# Patient Record
Sex: FEMALE | Race: WHITE | NOT HISPANIC OR LATINO | URBAN - METROPOLITAN AREA
[De-identification: names, ages, dates, MRNs, and addresses within clinical notes are randomized per-mention and may not be internally consistent; named-entity substitution may affect disease eponyms.]

---

## 2017-04-05 ENCOUNTER — APPOINTMENT (RX ONLY)
Dept: URBAN - METROPOLITAN AREA CLINIC 167 | Facility: CLINIC | Age: 47
Setting detail: DERMATOLOGY
End: 2017-04-05

## 2017-04-05 DIAGNOSIS — Z41.9 ENCOUNTER FOR PROCEDURE FOR PURPOSES OTHER THAN REMEDYING HEALTH STATE, UNSPECIFIED: ICD-10-CM

## 2017-04-05 NOTE — HPI: OTHER
Condition:: Btx - a tx
Please Describe Your Condition:: No known contraindications to treatment with botulinum toxin . See \"Cosmetic Procedure\" note in Attachments.

## 2017-07-25 ENCOUNTER — APPOINTMENT (RX ONLY)
Dept: URBAN - METROPOLITAN AREA CLINIC 170 | Facility: CLINIC | Age: 47
Setting detail: DERMATOLOGY
End: 2017-07-25

## 2017-07-25 DIAGNOSIS — Z41.9 ENCOUNTER FOR PROCEDURE FOR PURPOSES OTHER THAN REMEDYING HEALTH STATE, UNSPECIFIED: ICD-10-CM

## 2017-07-25 PROCEDURE — ? BOTOX

## 2017-07-25 NOTE — PROCEDURE: BOTOX
Additional Area 1 Location: Face
Glabellar Complex Units: 0
Dilution (U/0.1 Cc): 4
Additional Area 1 Units: 28
Detail Level: Zone
Consent: Written consent obtained. Risks include but not limited to lid/brow ptosis, bruising, swelling, diplopia, temporary effect, incomplete chemical denervation.
Lot #: [id number]
Expiration Date (Month Year): 01/20

## 2017-11-14 ENCOUNTER — APPOINTMENT (RX ONLY)
Dept: URBAN - METROPOLITAN AREA CLINIC 170 | Facility: CLINIC | Age: 47
Setting detail: DERMATOLOGY
End: 2017-11-14

## 2017-11-14 DIAGNOSIS — Z41.9 ENCOUNTER FOR PROCEDURE FOR PURPOSES OTHER THAN REMEDYING HEALTH STATE, UNSPECIFIED: ICD-10-CM

## 2017-11-14 PROCEDURE — ? BOTOX

## 2017-11-14 NOTE — PROCEDURE: BOTOX
Nasal Root Units: 0
Expiration Date (Month Year): 04/20
Consent: Written consent obtained. Risks include but not limited to lid/brow ptosis, bruising, swelling, diplopia, temporary effect, incomplete chemical denervation.
Additional Area 1 Location: Face
Lot #: [id number]
Additional Area 1 Units: 28
Dilution (U/0.1 Cc): 4
Detail Level: Zone

## 2017-11-14 NOTE — HPI: OTHER
Condition:: Btx-a tx
Please Describe Your Condition:: No known contraindications to treatment with botulinum toxin . See “Cosmetic Procedure” note in Attachments.

## 2018-03-01 ENCOUNTER — APPOINTMENT (RX ONLY)
Dept: URBAN - METROPOLITAN AREA CLINIC 170 | Facility: CLINIC | Age: 48
Setting detail: DERMATOLOGY
End: 2018-03-01

## 2018-03-01 DIAGNOSIS — Z41.9 ENCOUNTER FOR PROCEDURE FOR PURPOSES OTHER THAN REMEDYING HEALTH STATE, UNSPECIFIED: ICD-10-CM

## 2018-03-01 PROCEDURE — ? BOTOX

## 2018-03-01 NOTE — PROCEDURE: BOTOX
Inferior Lateral Orbicularis Oculi Units: 0
Additional Area 1 Location: Face
Dilution (U/0.1 Cc): 4
Detail Level: Zone
Consent: Written consent obtained. Risks include but not limited to lid/brow ptosis, bruising, swelling, diplopia, temporary effect, incomplete chemical denervation.
Additional Area 1 Units: 28
Expiration Date (Month Year): 09/20
Lot #: [id number]

## 2018-06-07 ENCOUNTER — APPOINTMENT (RX ONLY)
Dept: URBAN - METROPOLITAN AREA CLINIC 170 | Facility: CLINIC | Age: 48
Setting detail: DERMATOLOGY
End: 2018-06-07

## 2018-06-07 DIAGNOSIS — Z41.9 ENCOUNTER FOR PROCEDURE FOR PURPOSES OTHER THAN REMEDYING HEALTH STATE, UNSPECIFIED: ICD-10-CM

## 2018-06-07 PROCEDURE — ? BOTOX

## 2018-06-07 NOTE — PROCEDURE: BOTOX
Expiration Date (Month Year): 10/20
Additional Area 3 Units: 0
Additional Area 1 Units: 28
Additional Area 1 Location: Face
Detail Level: Zone
Dilution (U/0.1 Cc): 4
Lot #: [id number]
Consent: Written consent obtained. Risks include but not limited to lid/brow ptosis, bruising, swelling, diplopia, temporary effect, incomplete chemical denervation.

## 2018-09-11 ENCOUNTER — APPOINTMENT (RX ONLY)
Dept: URBAN - METROPOLITAN AREA CLINIC 170 | Facility: CLINIC | Age: 48
Setting detail: DERMATOLOGY
End: 2018-09-11

## 2018-09-11 DIAGNOSIS — Z41.9 ENCOUNTER FOR PROCEDURE FOR PURPOSES OTHER THAN REMEDYING HEALTH STATE, UNSPECIFIED: ICD-10-CM

## 2018-09-11 PROCEDURE — ? BOTOX

## 2018-09-11 NOTE — HPI: OTHER
Condition:: btx-a treatment
Please Describe Your Condition:: comes in for btx-a treatment . No known contraindications to treatment with botulinum toxin. See “Cosmetic Procedure” note in Attachments.

## 2018-09-11 NOTE — PROCEDURE: BOTOX
Detail Level: Zone
Additional Area 4 Units: 0
Additional Area 1 Location: Face
Dilution (U/0.1 Cc): 4
Lot #: [id number]
Additional Area 1 Units: 29
Expiration Date (Month Year): 3/21
Consent: Written consent obtained. Risks include but not limited to lid/brow ptosis, bruising, swelling, diplopia, temporary effect, incomplete chemical denervation.

## 2018-12-18 ENCOUNTER — APPOINTMENT (RX ONLY)
Dept: URBAN - METROPOLITAN AREA CLINIC 170 | Facility: CLINIC | Age: 48
Setting detail: DERMATOLOGY
End: 2018-12-18

## 2018-12-18 DIAGNOSIS — Z41.9 ENCOUNTER FOR PROCEDURE FOR PURPOSES OTHER THAN REMEDYING HEALTH STATE, UNSPECIFIED: ICD-10-CM

## 2018-12-18 PROCEDURE — ? BOTOX

## 2018-12-18 NOTE — PROCEDURE: BOTOX
Additional Area 6 Units: 0
Dilution (U/0.1 Cc): 4
Additional Area 1 Units: 29
Consent: Written consent obtained. Risks include but not limited to lid/brow ptosis, bruising, swelling, diplopia, temporary effect, incomplete chemical denervation.
Detail Level: Zone
Expiration Date (Month Year): 04/21
Additional Area 1 Location: Face
Lot #: [id number]

## 2019-03-21 ENCOUNTER — APPOINTMENT (RX ONLY)
Dept: URBAN - METROPOLITAN AREA CLINIC 170 | Facility: CLINIC | Age: 49
Setting detail: DERMATOLOGY
End: 2019-03-21

## 2019-03-21 DIAGNOSIS — Z41.9 ENCOUNTER FOR PROCEDURE FOR PURPOSES OTHER THAN REMEDYING HEALTH STATE, UNSPECIFIED: ICD-10-CM

## 2019-03-21 PROCEDURE — ? BOTOX

## 2019-03-21 NOTE — PROCEDURE: BOTOX
Additional Area 1 Location: Face
Additional Area 3 Units: 0
Dilution (U/0.1 Cc): 4
Expiration Date (Month Year): 07/21
Additional Area 1 Units: 29
Lot #: [id number]
Detail Level: Zone
Consent: Written consent obtained. Risks include but not limited to lid/brow ptosis, bruising, swelling, diplopia, temporary effect, incomplete chemical denervation.

## 2019-06-25 ENCOUNTER — APPOINTMENT (RX ONLY)
Dept: URBAN - METROPOLITAN AREA CLINIC 170 | Facility: CLINIC | Age: 49
Setting detail: DERMATOLOGY
End: 2019-06-25

## 2019-06-25 DIAGNOSIS — Z41.9 ENCOUNTER FOR PROCEDURE FOR PURPOSES OTHER THAN REMEDYING HEALTH STATE, UNSPECIFIED: ICD-10-CM

## 2019-06-25 PROCEDURE — ? BOTOX

## 2019-06-25 NOTE — PROCEDURE: BOTOX
Glabellar Complex Units: 0
Dilution (U/0.1 Cc): 4
Consent: Written consent obtained. Risks include but not limited to lid/brow ptosis, bruising, swelling, diplopia, temporary effect, incomplete chemical denervation.
Additional Area 1 Units: 29
Expiration Date (Month Year): 09/21
Lot #: [id number]
Additional Area 1 Location: Face
Detail Level: Zone

## 2019-10-31 ENCOUNTER — APPOINTMENT (RX ONLY)
Dept: URBAN - METROPOLITAN AREA CLINIC 170 | Facility: CLINIC | Age: 49
Setting detail: DERMATOLOGY
End: 2019-10-31

## 2019-10-31 DIAGNOSIS — Z41.9 ENCOUNTER FOR PROCEDURE FOR PURPOSES OTHER THAN REMEDYING HEALTH STATE, UNSPECIFIED: ICD-10-CM

## 2019-10-31 PROCEDURE — ? BOTOX

## 2019-10-31 NOTE — PROCEDURE: BOTOX
Additional Area 5 Units: 0
Show Right And Left Brow Units: No
Show Nasal Units: Yes
Additional Area 1 Location: Face
Expiration Date (Month Year): 02/22
Additional Area 1 Units: 35
Lot #: [id number]
Consent: Written consent obtained. Risks include but not limited to lid/brow ptosis, bruising, swelling, diplopia, temporary effect, incomplete chemical denervation.
Dilution (U/0.1 Cc): 4
Detail Level: Zone

## 2019-10-31 NOTE — HPI: OTHER
Condition:: Btx- a tx
Please Describe Your Condition:: comes in for Btx- a tx . No known contraindications to treatment with botulinum toxin. See “Cosmetic Procedure” note in Attachments.

## 2020-02-27 ENCOUNTER — APPOINTMENT (RX ONLY)
Dept: URBAN - METROPOLITAN AREA CLINIC 173 | Facility: CLINIC | Age: 50
Setting detail: DERMATOLOGY
End: 2020-02-27

## 2020-02-27 DIAGNOSIS — Z41.9 ENCOUNTER FOR PROCEDURE FOR PURPOSES OTHER THAN REMEDYING HEALTH STATE, UNSPECIFIED: ICD-10-CM

## 2020-02-27 PROCEDURE — ? BOTOX

## 2020-02-27 NOTE — PROCEDURE: BOTOX
Inferior Lateral Orbicularis Oculi Units: 0
Show Masseter Units: Yes
Lot #: [id number]
Show Ucl Units: No
Dilution (U/0.1 Cc): 4
Detail Level: Zone
Consent: Written consent obtained. Risks include but not limited to lid/brow ptosis, bruising, swelling, diplopia, temporary effect, incomplete chemical denervation.
Additional Area 1 Location: Face
Additional Area 1 Units: 35
Expiration Date (Month Year): 8/22

## 2020-06-23 ENCOUNTER — APPOINTMENT (RX ONLY)
Dept: URBAN - METROPOLITAN AREA CLINIC 173 | Facility: CLINIC | Age: 50
Setting detail: DERMATOLOGY
End: 2020-06-23

## 2020-06-23 DIAGNOSIS — Z41.9 ENCOUNTER FOR PROCEDURE FOR PURPOSES OTHER THAN REMEDYING HEALTH STATE, UNSPECIFIED: ICD-10-CM

## 2020-06-23 PROCEDURE — ? BOTOX

## 2020-06-23 NOTE — PROCEDURE: BOTOX
Show Orbicularis Oculi Units: Yes
Additional Area 4 Units: 0
Detail Level: Zone
Show Right And Left Pupillary Line Units: No
Dilution (U/0.1 Cc): 4
Consent: Written consent obtained. Risks include but not limited to lid/brow ptosis, bruising, swelling, diplopia, temporary effect, incomplete chemical denervation.
Lot #: [id number]
Additional Area 1 Location: Face
Expiration Date (Month Year): 11/22
Additional Area 1 Units: 35

## 2020-06-23 NOTE — HPI: OTHER
Condition:: Btx-a tx
Please Describe Your Condition:: is being seen for Btx-a tx . No known contraindications to treatment with botulinum toxin. See “Cosmetic Procedure” note in Attachments.

## 2020-10-15 ENCOUNTER — APPOINTMENT (RX ONLY)
Dept: URBAN - METROPOLITAN AREA CLINIC 173 | Facility: CLINIC | Age: 50
Setting detail: DERMATOLOGY
End: 2020-10-15

## 2020-10-15 DIAGNOSIS — Z41.9 ENCOUNTER FOR PROCEDURE FOR PURPOSES OTHER THAN REMEDYING HEALTH STATE, UNSPECIFIED: ICD-10-CM

## 2020-10-15 PROCEDURE — ? BOTOX

## 2020-10-15 NOTE — PROCEDURE: BOTOX
Show Nasal Units: Yes
Lcl Root Units: 0
Expiration Date (Month Year): 05/23
Additional Area 1 Units: 35
Show Lcl Units: No
Detail Level: Zone
Consent: Written consent obtained. Risks include but not limited to lid/brow ptosis, bruising, swelling, diplopia, temporary effect, incomplete chemical denervation.
Lot #: [id number]
Additional Area 1 Location: Face
Dilution (U/0.1 Cc): 4

## 2020-12-07 NOTE — HPI: OTHER
Condition:: Botox-a tx
Please Describe Your Condition:: comes in for Botox-a tx . No known contraindications to botulism toxin with COLIN.
Implemented All Universal Safety Interventions:  [place] to call system. Call bell, personal items and telephone within reach. Instruct patient to call for assistance. Room bathroom lighting operational. Non-slip footwear when patient is off stretcher. Physically safe environment: no spills, clutter or unnecessary equipment. Stretcher in lowest position, wheels locked, appropriate side rails in place.

## 2021-03-09 ENCOUNTER — APPOINTMENT (RX ONLY)
Dept: URBAN - METROPOLITAN AREA CLINIC 173 | Facility: CLINIC | Age: 51
Setting detail: DERMATOLOGY
End: 2021-03-09

## 2021-03-09 DIAGNOSIS — Z41.9 ENCOUNTER FOR PROCEDURE FOR PURPOSES OTHER THAN REMEDYING HEALTH STATE, UNSPECIFIED: ICD-10-CM

## 2021-03-09 PROCEDURE — ? BOTOX

## 2021-03-09 NOTE — PROCEDURE: BOTOX
Show Right And Left Periorbital Units: No
Show Depressor Anguli Units: Yes
Additional Area 5 Units: 0
Additional Area 1 Units: 35
Dilution (U/0.1 Cc): 4
Additional Area 1 Location: Face
Lot #: [id number]
Consent: Written consent obtained. Risks include but not limited to lid/brow ptosis, bruising, swelling, diplopia, temporary effect, incomplete chemical denervation.
Expiration Date (Month Year): 10/23
Detail Level: Zone

## 2021-10-04 ENCOUNTER — APPOINTMENT (RX ONLY)
Dept: URBAN - METROPOLITAN AREA CLINIC 173 | Facility: CLINIC | Age: 51
Setting detail: DERMATOLOGY
End: 2021-10-04

## 2021-10-04 DIAGNOSIS — Z41.9 ENCOUNTER FOR PROCEDURE FOR PURPOSES OTHER THAN REMEDYING HEALTH STATE, UNSPECIFIED: ICD-10-CM

## 2021-10-04 PROCEDURE — ? BOTOX

## 2021-10-04 NOTE — PROCEDURE: BOTOX
Show Additional Area 4: Yes
Detail Level: Zone
Inferior Lateral Orbicularis Oculi Units: 0
Show Ucl Units: No
Lot #: [id number]
Consent: Written consent obtained. Risks include but not limited to lid/brow ptosis, bruising, swelling, diplopia, temporary effect, incomplete chemical denervation.
Dilution (U/0.1 Cc): 4
Additional Area 1 Location: Face
Expiration Date (Month Year): 01/24
Additional Area 1 Units: 35

## 2022-01-17 ENCOUNTER — APPOINTMENT (RX ONLY)
Dept: URBAN - METROPOLITAN AREA CLINIC 173 | Facility: CLINIC | Age: 52
Setting detail: DERMATOLOGY
End: 2022-01-17

## 2022-01-17 DIAGNOSIS — Z41.9 ENCOUNTER FOR PROCEDURE FOR PURPOSES OTHER THAN REMEDYING HEALTH STATE, UNSPECIFIED: ICD-10-CM

## 2022-01-17 PROCEDURE — ? BOTOX

## 2022-01-17 NOTE — PROCEDURE: BOTOX
Mentalis Units: 0
Detail Level: Zone
Show Masseter Units: Yes
Show Right And Left Periorbital Units: No
Consent: Written consent obtained. Risks include but not limited to lid/brow ptosis, bruising, swelling, diplopia, temporary effect, incomplete chemical denervation.
Additional Area 1 Units: 40
Dilution (U/0.1 Cc): 4
Expiration Date (Month Year): 04/24
Lot #: [id number]
Additional Area 1 Location: Face

## 2022-05-04 ENCOUNTER — APPOINTMENT (RX ONLY)
Dept: URBAN - METROPOLITAN AREA CLINIC 173 | Facility: CLINIC | Age: 52
Setting detail: DERMATOLOGY
End: 2022-05-04

## 2022-05-04 DIAGNOSIS — Z41.9 ENCOUNTER FOR PROCEDURE FOR PURPOSES OTHER THAN REMEDYING HEALTH STATE, UNSPECIFIED: ICD-10-CM

## 2022-05-04 PROCEDURE — ? BOTOX

## 2022-05-04 NOTE — PROCEDURE: BOTOX
Post-Care Instructions: Patient instructed to not lie down for 4 hours and limit physical activity for 24 hours. Patient instructed not to travel by airplane for 48 hours.
Show Masseter Units: Yes
Left Periorbital Units: 0
Show Right And Left Periorbital Units: No
Lot #: [id number]
Glabellar Complex Units: 21
Expiration Date (Month Year): 05/2024
Dilution (U/0.1 Cc): 5
Price (Use Numbers Only, No Special Characters Or $): 549
Detail Level: Detailed
Periorbital Skin Units: 13
Consent: Written consent obtained. Risks include but not limited to lid/brow ptosis, bruising, swelling, diplopia, temporary effect, incomplete chemical denervation.
Forehead Units: 4

## 2022-08-16 ENCOUNTER — APPOINTMENT (RX ONLY)
Dept: URBAN - METROPOLITAN AREA CLINIC 173 | Facility: CLINIC | Age: 52
Setting detail: DERMATOLOGY
End: 2022-08-16

## 2022-08-16 DIAGNOSIS — Z41.9 ENCOUNTER FOR PROCEDURE FOR PURPOSES OTHER THAN REMEDYING HEALTH STATE, UNSPECIFIED: ICD-10-CM

## 2022-08-16 PROCEDURE — ? BOTOX

## 2022-08-16 NOTE — PROCEDURE: BOTOX
Lateral Platysmal Bands Units: 0
Show Mentalis Units: No
Show Glabellar Units: Yes
Dilution (U/0.1 Cc): 0.2
Lot #: [id number]
Additional Area 1 Location: face
Additional Area 1 Units: 42
Expiration Date (Month Year): 7/24
Detail Level: Zone
Price (Use Numbers Only, No Special Characters Or $): 535
Consent: Written consent obtained. Risks include but not limited to lid/brow ptosis, bruising, swelling, diplopia, temporary effect, incomplete chemical denervation.
Post-Care Instructions: Patient instructed to not lie down for 4 hours and limit physical activity for 24 hours.
Additional Area 2 Location: [facility]

## 2022-11-28 ENCOUNTER — APPOINTMENT (RX ONLY)
Dept: URBAN - METROPOLITAN AREA CLINIC 173 | Facility: CLINIC | Age: 52
Setting detail: DERMATOLOGY
End: 2022-11-28

## 2022-11-28 DIAGNOSIS — Z41.9 ENCOUNTER FOR PROCEDURE FOR PURPOSES OTHER THAN REMEDYING HEALTH STATE, UNSPECIFIED: ICD-10-CM

## 2022-11-28 PROCEDURE — ? BOTOX

## 2022-11-28 NOTE — PROCEDURE: BOTOX
Lateral Platysmal Bands Units: 0
Show Mentalis Units: No
Show Glabellar Units: Yes
Dilution (U/0.1 Cc): 0.2
Lot #: [id number]
Additional Area 1 Location: face
Forehead Units: 10
Glabellar Complex Units: 21
Expiration Date (Month Year): 11/24
Detail Level: Zone
Periorbital Skin Units: 13
Price (Use Numbers Only, No Special Characters Or $): 094
Consent: Written consent obtained. Risks include but not limited to lid/brow ptosis, bruising, swelling, diplopia, temporary effect, incomplete chemical denervation.
Post-Care Instructions: Patient instructed to not lie down for 4 hours and limit physical activity for 24 hours.
Additional Area 2 Location: [facility]

## 2022-12-12 ENCOUNTER — APPOINTMENT (RX ONLY)
Dept: URBAN - METROPOLITAN AREA CLINIC 173 | Facility: CLINIC | Age: 52
Setting detail: DERMATOLOGY
End: 2022-12-12

## 2022-12-12 DIAGNOSIS — Z41.9 ENCOUNTER FOR PROCEDURE FOR PURPOSES OTHER THAN REMEDYING HEALTH STATE, UNSPECIFIED: ICD-10-CM

## 2022-12-12 PROCEDURE — ? DYSPORT

## 2022-12-12 NOTE — PROCEDURE: DYSPORT
Additional Area 6 Units: 0
Show Depressor Anguli Units: Yes
Detail Level: Detailed
Lot #: [id number] *DARSHANA Special*
Show Right And Left Pupillary Line Units: No
Dilution (U/ 0.1cc): 10
Expiration Date (Month Year): 04/23
Forehead Units: 2
Consent: Written consent obtained. Risks include but not limited to lid/brow ptosis, bruising, swelling, diplopia, temporary effect, incomplete chemical denervation.
Post-Care Instructions: Patient instructed to not lie down for 4 hours and limit physical activity for 24 hours.

## 2023-03-02 ENCOUNTER — APPOINTMENT (RX ONLY)
Dept: URBAN - METROPOLITAN AREA CLINIC 173 | Facility: CLINIC | Age: 53
Setting detail: DERMATOLOGY
End: 2023-03-02

## 2023-03-02 DIAGNOSIS — Z41.9 ENCOUNTER FOR PROCEDURE FOR PURPOSES OTHER THAN REMEDYING HEALTH STATE, UNSPECIFIED: ICD-10-CM

## 2023-03-02 PROCEDURE — ? BOTOX

## 2023-03-02 NOTE — PROCEDURE: BOTOX
Lateral Platysmal Bands Units: 0
Show Mentalis Units: No
Show Glabellar Units: Yes
Dilution (U/0.1 Cc): 0.2
Lot #: [id number]
Additional Area 1 Location: face
Forehead Units: 10
Glabellar Complex Units: 21
Expiration Date (Month Year): 11/24
Detail Level: Zone
Periorbital Skin Units: 13
Price (Use Numbers Only, No Special Characters Or $): 184
Consent: Written consent obtained. Risks include but not limited to lid/brow ptosis, bruising, swelling, diplopia, temporary effect, incomplete chemical denervation.
Post-Care Instructions: Patient instructed to not lie down for 4 hours and limit physical activity for 24 hours.
Additional Area 2 Location: [facility]

## 2023-06-07 ENCOUNTER — APPOINTMENT (RX ONLY)
Dept: URBAN - METROPOLITAN AREA CLINIC 173 | Facility: CLINIC | Age: 53
Setting detail: DERMATOLOGY
End: 2023-06-07

## 2023-06-07 DIAGNOSIS — Z41.9 ENCOUNTER FOR PROCEDURE FOR PURPOSES OTHER THAN REMEDYING HEALTH STATE, UNSPECIFIED: ICD-10-CM

## 2023-06-07 PROCEDURE — ? BOTOX

## 2023-06-07 NOTE — PROCEDURE: BOTOX
Lateral Platysmal Bands Units: 0
Show Mentalis Units: No
Show Glabellar Units: Yes
Dilution (U/0.1 Cc): 0.2
Lot #: [id number]
Additional Area 1 Location: face
Forehead Units: 10
Glabellar Complex Units: 21
Expiration Date (Month Year): 11/24
Detail Level: Zone
Periorbital Skin Units: 13
Price (Use Numbers Only, No Special Characters Or $): 285
Consent: Written consent obtained. Risks include but not limited to lid/brow ptosis, bruising, swelling, diplopia, temporary effect, incomplete chemical denervation.
Post-Care Instructions: Patient instructed to not lie down for 4 hours and limit physical activity for 24 hours.
Additional Area 2 Location: [facility]

## 2023-09-14 ENCOUNTER — APPOINTMENT (RX ONLY)
Dept: URBAN - METROPOLITAN AREA CLINIC 173 | Facility: CLINIC | Age: 53
Setting detail: DERMATOLOGY
End: 2023-09-14

## 2023-09-14 DIAGNOSIS — Z41.9 ENCOUNTER FOR PROCEDURE FOR PURPOSES OTHER THAN REMEDYING HEALTH STATE, UNSPECIFIED: ICD-10-CM

## 2023-09-14 PROCEDURE — ? BOTOX

## 2023-09-14 NOTE — PROCEDURE: BOTOX
Depressor Anguli Oris Units: 0
Show Anterior Platysmal Band Units: Yes
Consent: Written consent obtained. Risks include but not limited to lid/brow ptosis, bruising, swelling, diplopia, temporary effect, incomplete chemical denervation.
Post-Care Instructions: Patient instructed to not lie down for 4 hours and limit physical activity for 24 hours. Patient instructed not to travel by airplane for 48 hours.
Show Right And Left Periorbital Units: No
Periorbital Skin Units: 13
Dilution (U/0.1 Cc): 5
Lot #: [id number]
Incrementing Botox Units: By 0.5 Units
Glabellar Complex Units: 21
Forehead Units: 10
Expiration Date (Month Year): 12/25
Detail Level: Detailed
Price (Use Numbers Only, No Special Characters Or $): 393

## 2023-12-21 ENCOUNTER — APPOINTMENT (RX ONLY)
Dept: URBAN - METROPOLITAN AREA CLINIC 173 | Facility: CLINIC | Age: 53
Setting detail: DERMATOLOGY
End: 2023-12-21

## 2023-12-21 DIAGNOSIS — Z41.9 ENCOUNTER FOR PROCEDURE FOR PURPOSES OTHER THAN REMEDYING HEALTH STATE, UNSPECIFIED: ICD-10-CM

## 2023-12-21 PROCEDURE — ? BOTOX

## 2023-12-21 NOTE — PROCEDURE: BOTOX
Depressor Anguli Oris Units: 0
Show Anterior Platysmal Band Units: Yes
Consent: Written consent obtained. Risks include but not limited to lid/brow ptosis, bruising, swelling, diplopia, temporary effect, incomplete chemical denervation.
Post-Care Instructions: Patient instructed to not lie down for 4 hours and limit physical activity for 24 hours. Patient instructed not to travel by airplane for 48 hours.
Show Right And Left Periorbital Units: No
Periorbital Skin Units: 13
Dilution (U/0.1 Cc): 5
Lot #: [id number]
Incrementing Botox Units: By 0.5 Units
Glabellar Complex Units: 21
Forehead Units: 10
Expiration Date (Month Year): 04/2026
Detail Level: Detailed
Price (Use Numbers Only, No Special Characters Or $): 340

## 2024-01-11 ENCOUNTER — APPOINTMENT (RX ONLY)
Dept: URBAN - METROPOLITAN AREA CLINIC 173 | Facility: CLINIC | Age: 54
Setting detail: DERMATOLOGY
End: 2024-01-11

## 2024-01-11 DIAGNOSIS — Z41.9 ENCOUNTER FOR PROCEDURE FOR PURPOSES OTHER THAN REMEDYING HEALTH STATE, UNSPECIFIED: ICD-10-CM

## 2024-01-11 PROCEDURE — ? DYSPORT

## 2024-01-11 NOTE — PROCEDURE: DYSPORT
Lateral Platysmal Bands Units: 0
Show Orbicularis Oculi Units: Yes
Additional Area 1 Location: face
Show Mentalis Units: No
Additional Area 1 Units: 13
Post-Care Instructions: Patient instructed to not lie down for 4 hours and limit physical activity for 24 hours.
Consent: Written consent obtained. Risks include but not limited to lid/brow ptosis, bruising, swelling, diplopia, temporary effect, incomplete chemical denervation.
Lot #: [id number] *DARSHANA Special*
Detail Level: Detailed
Dilution (U/0.1 Cc): 10
Expiration Date (Month Year): 2/28/2025

## 2025-02-06 ENCOUNTER — APPOINTMENT (OUTPATIENT)
Dept: URBAN - METROPOLITAN AREA CLINIC 173 | Facility: CLINIC | Age: 55
Setting detail: DERMATOLOGY
End: 2025-02-06

## 2025-02-06 DIAGNOSIS — L81.1 CHLOASMA: ICD-10-CM

## 2025-02-06 DIAGNOSIS — Z41.9 ENCOUNTER FOR PROCEDURE FOR PURPOSES OTHER THAN REMEDYING HEALTH STATE, UNSPECIFIED: ICD-10-CM

## 2025-02-06 PROCEDURE — ? BOTOX

## 2025-02-06 PROCEDURE — ? PRESCRIPTION

## 2025-02-06 PROCEDURE — ? COSMETIC CONSULTATION: LASER RESURFACING

## 2025-02-06 PROCEDURE — ? COSMETIC CONSULTATION: CLEAR AND BRILLIANT

## 2025-02-06 NOTE — PROCEDURE: BOTOX
Depressor Anguli Oris Units: 0
Show Anterior Platysmal Band Units: Yes
Consent: Written consent obtained. Risks include but not limited to lid/brow ptosis, bruising, swelling, diplopia, temporary effect, incomplete chemical denervation.
Post-Care Instructions: Patient instructed to not lie down for 4 hours and limit physical activity for 24 hours. Patient instructed not to travel by airplane for 48 hours.
Show Right And Left Periorbital Units: No
Periorbital Skin Units: 14
Dilution (U/0.1 Cc): 5
Lot #: [id number]
Incrementing Botox Units: By 0.5 Units
Glabellar Complex Units: 22
Forehead Units: 8
Expiration Date (Month Year): 3/2027
Detail Level: Detailed
Price (Use Numbers Only, No Special Characters Or $): 821

## 2025-02-07 RX ORDER — PHARMACY COMPOUNDING ACCESSORY
G EACH MISCELLANEOUS NIGHTLY
Qty: 30 | Refills: 1 | Status: ERX | COMMUNITY
Start: 2025-02-07

## 2025-02-07 RX ADMIN — Medication G: at 00:00

## 2025-05-15 ENCOUNTER — APPOINTMENT (OUTPATIENT)
Dept: URBAN - METROPOLITAN AREA CLINIC 173 | Facility: CLINIC | Age: 55
Setting detail: DERMATOLOGY
End: 2025-05-15

## 2025-05-15 DIAGNOSIS — Z41.9 ENCOUNTER FOR PROCEDURE FOR PURPOSES OTHER THAN REMEDYING HEALTH STATE, UNSPECIFIED: ICD-10-CM

## 2025-05-15 PROCEDURE — ? BOTOX

## 2025-05-15 PROCEDURE — ? COSMETIC CONSULTATION: BOTULINUM TOXIN

## 2025-05-15 NOTE — PROCEDURE: BOTOX
Depressor Anguli Oris Units: 0
Show Anterior Platysmal Band Units: Yes
Consent: Written consent obtained. Risks include but not limited to lid/brow ptosis, bruising, swelling, diplopia, temporary effect, incomplete chemical denervation.
Post-Care Instructions: Patient instructed to not lie down for 4 hours and limit physical activity for 24 hours. Patient instructed not to travel by airplane for 48 hours.
Show Right And Left Periorbital Units: No
Periorbital Skin Units: 14
Dilution (U/0.1 Cc): 5
Lot #: [id number]
Incrementing Botox Units: By 0.5 Units
Glabellar Complex Units: 22
Forehead Units: 8
Expiration Date (Month Year): 05/2027
Detail Level: Detailed
Price (Use Numbers Only, No Special Characters Or $): 944

## 2025-05-15 NOTE — HPI: COSMETIC (BOTOX)
You can access the FollowMyHealth Patient Portal offered by [facility] by registering at the following website: [email or web address]. By joining [facility]’s FollowMyHealth portal, you will also be able to view your health information using other applications (apps) compatible with our system.
Have You Had Botox Before?: has had botox
When Was Your Last Botox Treatment?: 02/06/2025

## 2025-07-14 ENCOUNTER — APPOINTMENT (OUTPATIENT)
Dept: URBAN - METROPOLITAN AREA CLINIC 173 | Facility: CLINIC | Age: 55
Setting detail: DERMATOLOGY
End: 2025-07-14

## 2025-07-14 DIAGNOSIS — Z41.9 ENCOUNTER FOR PROCEDURE FOR PURPOSES OTHER THAN REMEDYING HEALTH STATE, UNSPECIFIED: ICD-10-CM

## 2025-07-14 PROCEDURE — ? PULSED-DYE LASER

## 2025-07-14 NOTE — PROCEDURE: PULSED-DYE LASER
Post-Care Instructions: I reviewed with the patient in detail post-care instructions. Patient should stay away from the sun and wear sun protection until treated areas are fully healed.
Location Override: right cheek, corner of nose on right side, left under eye
Cryogen Time (Ms): 30
Pulse Duration: 10 ms
Delay Time (Ms): 20
Pulse Count: 8
Spot Size: 7 mm
Spot Size: 10 mm
Laser Type: Vbeam 595nm
Consent: Written consent obtained, risks reviewed including but not limited to crusting, scabbing, blistering, scarring, darker or lighter pigmentary change, incidental hair removal, bruising, and/or incomplete removal.
Fluence In J/Cm2 (Optional): 8.5
Price (Use Numbers Only, No Special Characters Or $): 466
Detail Level: Zone
Post-Procedure Care: Steroid and ice applied. Post care reviewed with patient.

## 2025-08-21 ENCOUNTER — APPOINTMENT (OUTPATIENT)
Dept: URBAN - METROPOLITAN AREA CLINIC 173 | Facility: CLINIC | Age: 55
Setting detail: DERMATOLOGY
End: 2025-08-21

## 2025-08-21 DIAGNOSIS — Z41.9 ENCOUNTER FOR PROCEDURE FOR PURPOSES OTHER THAN REMEDYING HEALTH STATE, UNSPECIFIED: ICD-10-CM

## 2025-08-21 PROCEDURE — ? BOTOX

## 2025-08-21 PROCEDURE — ? PULSED-DYE LASER

## 2025-08-21 ASSESSMENT — LOCATION ZONE DERM: LOCATION ZONE: FACE

## 2025-08-21 ASSESSMENT — LOCATION SIMPLE DESCRIPTION DERM: LOCATION SIMPLE: RIGHT CHEEK

## 2025-08-21 ASSESSMENT — LOCATION DETAILED DESCRIPTION DERM: LOCATION DETAILED: RIGHT MEDIAL MALAR CHEEK
